# Patient Record
Sex: MALE | Race: BLACK OR AFRICAN AMERICAN | Employment: OTHER | URBAN - METROPOLITAN AREA
[De-identification: names, ages, dates, MRNs, and addresses within clinical notes are randomized per-mention and may not be internally consistent; named-entity substitution may affect disease eponyms.]

---

## 2020-01-30 ENCOUNTER — APPOINTMENT (OUTPATIENT)
Dept: GENERAL RADIOLOGY | Facility: HOSPITAL | Age: 65
DRG: 253 | End: 2020-01-30
Attending: EMERGENCY MEDICINE
Payer: MEDICARE

## 2020-01-30 ENCOUNTER — APPOINTMENT (OUTPATIENT)
Dept: MRI IMAGING | Facility: HOSPITAL | Age: 65
DRG: 253 | End: 2020-01-30
Attending: INTERNAL MEDICINE
Payer: MEDICARE

## 2020-01-30 ENCOUNTER — APPOINTMENT (OUTPATIENT)
Dept: ULTRASOUND IMAGING | Facility: HOSPITAL | Age: 65
DRG: 253 | End: 2020-01-30
Attending: EMERGENCY MEDICINE
Payer: MEDICARE

## 2020-01-30 ENCOUNTER — HOSPITAL ENCOUNTER (INPATIENT)
Facility: HOSPITAL | Age: 65
LOS: 6 days | Discharge: NURSING, REHAB OR LONG-TERM CARE | DRG: 253 | End: 2020-02-05
Attending: EMERGENCY MEDICINE | Admitting: HOSPITALIST
Payer: MEDICARE

## 2020-01-30 DIAGNOSIS — I82.431 ACUTE DEEP VEIN THROMBOSIS (DVT) OF POPLITEAL VEIN OF RIGHT LOWER EXTREMITY (HCC): ICD-10-CM

## 2020-01-30 DIAGNOSIS — L08.9 DIABETIC INFECTION OF RIGHT FOOT (HCC): Primary | ICD-10-CM

## 2020-01-30 DIAGNOSIS — E11.65 TYPE 2 DIABETES MELLITUS WITH HYPERGLYCEMIA, WITH LONG-TERM CURRENT USE OF INSULIN (HCC): ICD-10-CM

## 2020-01-30 DIAGNOSIS — E11.628 DIABETIC INFECTION OF RIGHT FOOT (HCC): Primary | ICD-10-CM

## 2020-01-30 DIAGNOSIS — Z79.4 TYPE 2 DIABETES MELLITUS WITH HYPERGLYCEMIA, WITH LONG-TERM CURRENT USE OF INSULIN (HCC): ICD-10-CM

## 2020-01-30 LAB
ANION GAP SERPL CALC-SCNC: 6 MMOL/L (ref 0–18)
APTT PPP: 27.5 SECONDS (ref 23.2–35.3)
APTT PPP: 87.6 SECONDS (ref 23.2–35.3)
BASOPHILS # BLD AUTO: 0.06 X10(3) UL (ref 0–0.2)
BASOPHILS NFR BLD AUTO: 0.7 %
BUN BLD-MCNC: 10 MG/DL (ref 7–18)
BUN/CREAT SERPL: 10.1 (ref 10–20)
CALCIUM BLD-MCNC: 9.5 MG/DL (ref 8.5–10.1)
CHLORIDE SERPL-SCNC: 101 MMOL/L (ref 98–112)
CO2 SERPL-SCNC: 29 MMOL/L (ref 21–32)
CREAT BLD-MCNC: 0.99 MG/DL (ref 0.7–1.3)
DEPRECATED RDW RBC AUTO: 39.5 FL (ref 35.1–46.3)
EOSINOPHIL # BLD AUTO: 0.25 X10(3) UL (ref 0–0.7)
EOSINOPHIL NFR BLD AUTO: 3 %
ERYTHROCYTE [DISTWIDTH] IN BLOOD BY AUTOMATED COUNT: 12.6 % (ref 11–15)
EST. AVERAGE GLUCOSE BLD GHB EST-MCNC: 384 MG/DL (ref 68–126)
GLUCOSE BLD-MCNC: 369 MG/DL (ref 70–99)
GLUCOSE BLDC GLUCOMTR-MCNC: 121 MG/DL (ref 70–99)
GLUCOSE BLDC GLUCOMTR-MCNC: 170 MG/DL (ref 70–99)
GLUCOSE BLDC GLUCOMTR-MCNC: 185 MG/DL (ref 70–99)
GLUCOSE BLDC GLUCOMTR-MCNC: 250 MG/DL (ref 70–99)
GLUCOSE BLDC GLUCOMTR-MCNC: 328 MG/DL (ref 70–99)
GLUCOSE BLDC GLUCOMTR-MCNC: 409 MG/DL (ref 70–99)
GLUCOSE BLDC GLUCOMTR-MCNC: 418 MG/DL (ref 70–99)
HBA1C MFR BLD HPLC: 15 % (ref ?–5.7)
HCT VFR BLD AUTO: 40.8 % (ref 39–53)
HGB BLD-MCNC: 13.2 G/DL (ref 13–17.5)
IMM GRANULOCYTES # BLD AUTO: 0.03 X10(3) UL (ref 0–1)
IMM GRANULOCYTES NFR BLD: 0.4 %
LACTATE SERPL-SCNC: 1.3 MMOL/L (ref 0.4–2)
LYMPHOCYTES # BLD AUTO: 2.22 X10(3) UL (ref 1–4)
LYMPHOCYTES NFR BLD AUTO: 26.5 %
MCH RBC QN AUTO: 27.7 PG (ref 26–34)
MCHC RBC AUTO-ENTMCNC: 32.4 G/DL (ref 31–37)
MCV RBC AUTO: 85.7 FL (ref 80–100)
MONOCYTES # BLD AUTO: 0.94 X10(3) UL (ref 0.1–1)
MONOCYTES NFR BLD AUTO: 11.2 %
NEUTROPHILS # BLD AUTO: 4.88 X10 (3) UL (ref 1.5–7.7)
NEUTROPHILS # BLD AUTO: 4.88 X10(3) UL (ref 1.5–7.7)
NEUTROPHILS NFR BLD AUTO: 58.2 %
OSMOLALITY SERPL CALC.SUM OF ELEC: 296 MOSM/KG (ref 275–295)
PLATELET # BLD AUTO: 362 10(3)UL (ref 150–450)
POTASSIUM SERPL-SCNC: 4.6 MMOL/L (ref 3.5–5.1)
RBC # BLD AUTO: 4.76 X10(6)UL (ref 4.3–5.7)
SODIUM SERPL-SCNC: 136 MMOL/L (ref 136–145)
WBC # BLD AUTO: 8.4 X10(3) UL (ref 4–11)

## 2020-01-30 PROCEDURE — 99221 1ST HOSP IP/OBS SF/LOW 40: CPT | Performed by: PODIATRIST

## 2020-01-30 PROCEDURE — 93971 EXTREMITY STUDY: CPT | Performed by: EMERGENCY MEDICINE

## 2020-01-30 PROCEDURE — 0Y9M0ZZ DRAINAGE OF RIGHT FOOT, OPEN APPROACH: ICD-10-PCS | Performed by: EMERGENCY MEDICINE

## 2020-01-30 PROCEDURE — 73630 X-RAY EXAM OF FOOT: CPT | Performed by: EMERGENCY MEDICINE

## 2020-01-30 RX ORDER — TRAMADOL HYDROCHLORIDE 50 MG/1
50 TABLET ORAL EVERY 6 HOURS PRN
Status: DISCONTINUED | OUTPATIENT
Start: 2020-01-30 | End: 2020-02-05

## 2020-01-30 RX ORDER — HEPARIN SODIUM AND DEXTROSE 10000; 5 [USP'U]/100ML; G/100ML
18 INJECTION INTRAVENOUS ONCE
Status: COMPLETED | OUTPATIENT
Start: 2020-01-30 | End: 2020-01-30

## 2020-01-30 RX ORDER — VANCOMYCIN HYDROCHLORIDE
15 EVERY 12 HOURS
Status: DISCONTINUED | OUTPATIENT
Start: 2020-01-30 | End: 2020-01-31

## 2020-01-30 RX ORDER — ATORVASTATIN CALCIUM 10 MG/1
10 TABLET, FILM COATED ORAL NIGHTLY
Status: DISCONTINUED | OUTPATIENT
Start: 2020-01-30 | End: 2020-02-05

## 2020-01-30 RX ORDER — POLYETHYLENE GLYCOL 3350 17 G/17G
17 POWDER, FOR SOLUTION ORAL DAILY PRN
Status: DISCONTINUED | OUTPATIENT
Start: 2020-01-30 | End: 2020-02-05

## 2020-01-30 RX ORDER — VANCOMYCIN 2 GRAM/500 ML IN 0.9 % SODIUM CHLORIDE INTRAVENOUS
25 ONCE
Status: COMPLETED | OUTPATIENT
Start: 2020-01-30 | End: 2020-01-30

## 2020-01-30 RX ORDER — BUDESONIDE AND FORMOTEROL FUMARATE DIHYDRATE 80; 4.5 UG/1; UG/1
2 AEROSOL RESPIRATORY (INHALATION) 2 TIMES DAILY PRN
COMMUNITY

## 2020-01-30 RX ORDER — HEPARIN SODIUM 1000 [USP'U]/ML
80 INJECTION, SOLUTION INTRAVENOUS; SUBCUTANEOUS ONCE
Status: COMPLETED | OUTPATIENT
Start: 2020-01-30 | End: 2020-01-30

## 2020-01-30 RX ORDER — HYDROCODONE BITARTRATE AND ACETAMINOPHEN 5; 325 MG/1; MG/1
2 TABLET ORAL ONCE
Status: COMPLETED | OUTPATIENT
Start: 2020-01-30 | End: 2020-01-30

## 2020-01-30 RX ORDER — ONDANSETRON 2 MG/ML
4 INJECTION INTRAMUSCULAR; INTRAVENOUS EVERY 6 HOURS PRN
Status: DISCONTINUED | OUTPATIENT
Start: 2020-01-30 | End: 2020-02-05

## 2020-01-30 RX ORDER — HEPARIN SODIUM AND DEXTROSE 10000; 5 [USP'U]/100ML; G/100ML
INJECTION INTRAVENOUS CONTINUOUS
Status: DISCONTINUED | OUTPATIENT
Start: 2020-01-30 | End: 2020-02-02

## 2020-01-30 RX ORDER — ATORVASTATIN CALCIUM 10 MG/1
10 TABLET, FILM COATED ORAL NIGHTLY
COMMUNITY

## 2020-01-30 RX ORDER — SODIUM CHLORIDE 0.9 % (FLUSH) 0.9 %
3 SYRINGE (ML) INJECTION AS NEEDED
Status: DISCONTINUED | OUTPATIENT
Start: 2020-01-30 | End: 2020-02-05

## 2020-01-30 RX ORDER — DEXTROSE MONOHYDRATE 25 G/50ML
50 INJECTION, SOLUTION INTRAVENOUS AS NEEDED
Status: DISCONTINUED | OUTPATIENT
Start: 2020-01-30 | End: 2020-02-05

## 2020-01-30 RX ORDER — BISACODYL 10 MG
10 SUPPOSITORY, RECTAL RECTAL
Status: DISCONTINUED | OUTPATIENT
Start: 2020-01-30 | End: 2020-02-05

## 2020-01-30 RX ORDER — ACETAMINOPHEN 325 MG/1
650 TABLET ORAL EVERY 6 HOURS PRN
Status: DISCONTINUED | OUTPATIENT
Start: 2020-01-30 | End: 2020-02-05

## 2020-01-30 NOTE — ED NOTES
after fluids and insulin. Patient on heparin drip at 19mL/hr. Patient A&O, cooperative. Being transported upstairs at this  Time.

## 2020-01-30 NOTE — ED INITIAL ASSESSMENT (HPI)
PRESENTS TO ED WITH WOUND TO PINKY TOE ON RIGHT FOOT WHICH PT STATES OCCURRED 4 DAYS AGO. UNSURE OF ORIGIN.  STATES IT STARTED AS \"SQUEEZING PUS FROM TOE\" AND BECAME AN ENLARGED, ODOROUS, DRAINING BLISTER

## 2020-01-30 NOTE — ED PROVIDER NOTES
Patient Seen in: [facility] AND [facility] Emergency Department      History   Patient presents with:  Lower Extremity Injury    Stated Complaint: blister to right toe diabetic     HPI    59year old male with a history of IDDM, also on antihypertensive and chol Current:/74 (BP Location: Right arm)   Pulse 70   Temp 98.3 °F (36.8 °C) (Oral)   Resp 18   Ht 203.2 cm (6' 8\")   Wt 103.8 kg   SpO2 98%   BMI 25.14 kg/m²         Physical Exam  Vitals signs and nursing note reviewed.    Constitutional:       General HEMOGLOBIN A1C - Abnormal; Notable for the following components:    HgbA1C 15.0 (*)     Estimated Average Glucose 384 (*)     All other components within normal limits   PTT, ACTIVATED - Abnormal; Notable for the following components:    PTT 87.6 (*)     A BLOOD CULTURE   ANAEROBIC CULTURE   CBC W/ DIFFERENTIAL           MDM     Pulse Ox: 98%, Normal, RA    Cardiac Monitor: Pulse Readings from Last 1 Encounters:  01/30/20 : 70  , sinus, normal for rate and rhythm     Radiology findings: Xr Foot, Complete (mi D/w Dr Alfreda Feliciano - will consult, advises send aerobic and anaerobic cultures from lanced abscess fluid. Abscess drainage: The patient abscess was located R dorsal foot.    I obtained verbal consent from the patient to drain the abscess who was informed ab

## 2020-01-30 NOTE — CONSULTS
[facility] AND [facility]  [address] Infectious Disease  Report of Consultation    Manolo Rodgers Patient Status:  Emergency    10/22/1955 MRN [id number]   Location [address] Attending Che Davis MD   Hosp Day # 0 PCP No primary care stridor. Cardiovascular: Negative for chest pain, palpitations, irregular heart beats. Gastrointestinal:  No abdominal pain, nausea, vomiting, diarrhea, or constipation. Genitourinary:  No dysuria, hematuria, urine urgency or frequency.    Integument/ CREATSERUM 0.99 01/30/2020    BUN 10 01/30/2020     01/30/2020    K 4.6 01/30/2020     01/30/2020    CO2 29.0 01/30/2020     01/30/2020    CA 9.5 01/30/2020    PTT 27.5 01/30/2020        Cultures:   GPC in pairs thus far    Radiology:

## 2020-01-30 NOTE — PROGRESS NOTES
[facility] Dosing Service    Initial Pharmacokinetic Consult for Vancomycin Dosing     Viji Membreno is a 59year old male who is being treated for diabetic foot. Pharmacy has been asked to dose Vancomycin by Dr. Kaur Bazan    He has No Known Allergies.

## 2020-01-30 NOTE — H&P
[facility] Hospitalist H&P       CC: Patient presents with:  Lower Extremity Injury       PCP: No primary care provider on file.     History of Present Illness: Patient is a 59year old male with PMH sig for COPD, HLD, HTN, DM on insulin, active smoker (1 cig/da pallor,     Nose: Nares normal. Septum midline. Mucosa normal. No drainage. Throat: Lips, mucosa, and tongue normal. Teeth and gums normal.   Neck: Supple    Lungs:   Clear to auscultation bilaterally.  Normal effort   Chest wall:  No tenderness or deform 1/30/2020 at 9:17     Approved by (CST): Eddie Stanford MD on 1/30/2020 at 9:22              ASSESSMENT / PLAN:   Patient is a 59year old male with PMH sig for COPD, HLD, HTN, DM on insulin, active smoker (1 cig/day) who presents with pain and drainag

## 2020-01-30 NOTE — ED NOTES
Pt has not had his medication today and states he ate a lot last night before bed including \"lots of fruit\".

## 2020-01-30 NOTE — ED NOTES
Orders for admission, patient is aware of plan and ready to go upstairs. Any questions, please call ED MAYNOR hamlin  at extension [phone]    Working with patients blood sugar, administering more insulin and fluids at this time, will re check BS after this.  Pt i

## 2020-01-31 ENCOUNTER — ANESTHESIA EVENT (OUTPATIENT)
Dept: SURGERY | Facility: HOSPITAL | Age: 65
DRG: 253 | End: 2020-01-31
Payer: MEDICARE

## 2020-01-31 ENCOUNTER — APPOINTMENT (OUTPATIENT)
Dept: INTERVENTIONAL RADIOLOGY/VASCULAR | Facility: HOSPITAL | Age: 65
DRG: 253 | End: 2020-01-31
Attending: RADIOLOGY
Payer: MEDICARE

## 2020-01-31 ENCOUNTER — ANESTHESIA (OUTPATIENT)
Dept: SURGERY | Facility: HOSPITAL | Age: 65
DRG: 253 | End: 2020-01-31
Payer: MEDICARE

## 2020-01-31 LAB
ANION GAP SERPL CALC-SCNC: 7 MMOL/L (ref 0–18)
APTT PPP: 30.7 SECONDS (ref 23.2–35.3)
APTT PPP: 64.9 SECONDS (ref 23.2–35.3)
BASOPHILS # BLD AUTO: 0.04 X10(3) UL (ref 0–0.2)
BASOPHILS NFR BLD AUTO: 0.5 %
BUN BLD-MCNC: 9 MG/DL (ref 7–18)
BUN/CREAT SERPL: 11.5 (ref 10–20)
CALCIUM BLD-MCNC: 9 MG/DL (ref 8.5–10.1)
CHLORIDE SERPL-SCNC: 103 MMOL/L (ref 98–112)
CO2 SERPL-SCNC: 27 MMOL/L (ref 21–32)
CREAT BLD-MCNC: 0.78 MG/DL (ref 0.7–1.3)
DEPRECATED RDW RBC AUTO: 38.7 FL (ref 35.1–46.3)
EOSINOPHIL # BLD AUTO: 0.26 X10(3) UL (ref 0–0.7)
EOSINOPHIL NFR BLD AUTO: 3.3 %
ERYTHROCYTE [DISTWIDTH] IN BLOOD BY AUTOMATED COUNT: 12.5 % (ref 11–15)
GLUCOSE BLD-MCNC: 104 MG/DL (ref 70–99)
GLUCOSE BLDC GLUCOMTR-MCNC: 143 MG/DL (ref 70–99)
GLUCOSE BLDC GLUCOMTR-MCNC: 150 MG/DL (ref 70–99)
GLUCOSE BLDC GLUCOMTR-MCNC: 300 MG/DL (ref 70–99)
GLUCOSE BLDC GLUCOMTR-MCNC: 81 MG/DL (ref 70–99)
GLUCOSE BLDC GLUCOMTR-MCNC: 97 MG/DL (ref 70–99)
HAV IGM SER QL: 1.9 MG/DL (ref 1.6–2.6)
HCT VFR BLD AUTO: 36.7 % (ref 39–53)
HGB BLD-MCNC: 12.2 G/DL (ref 13–17.5)
IMM GRANULOCYTES # BLD AUTO: 0.05 X10(3) UL (ref 0–1)
IMM GRANULOCYTES NFR BLD: 0.6 %
INR BLD: 0.95 (ref 0.9–1.2)
LYMPHOCYTES # BLD AUTO: 2.64 X10(3) UL (ref 1–4)
LYMPHOCYTES NFR BLD AUTO: 33.5 %
MCH RBC QN AUTO: 28.4 PG (ref 26–34)
MCHC RBC AUTO-ENTMCNC: 33.2 G/DL (ref 31–37)
MCV RBC AUTO: 85.3 FL (ref 80–100)
MONOCYTES # BLD AUTO: 0.93 X10(3) UL (ref 0.1–1)
MONOCYTES NFR BLD AUTO: 11.8 %
NEUTROPHILS # BLD AUTO: 3.97 X10 (3) UL (ref 1.5–7.7)
NEUTROPHILS # BLD AUTO: 3.97 X10(3) UL (ref 1.5–7.7)
NEUTROPHILS NFR BLD AUTO: 50.3 %
OSMOLALITY SERPL CALC.SUM OF ELEC: 283 MOSM/KG (ref 275–295)
PLATELET # BLD AUTO: 334 10(3)UL (ref 150–450)
POTASSIUM SERPL-SCNC: 4.5 MMOL/L (ref 3.5–5.1)
PROTHROMBIN TIME: 12.5 SECONDS (ref 11.8–14.5)
RBC # BLD AUTO: 4.3 X10(6)UL (ref 4.3–5.7)
SODIUM SERPL-SCNC: 137 MMOL/L (ref 136–145)
VANCOMYCIN TROUGH SERPL-MCNC: 10.9 UG/ML (ref 10–20)
WBC # BLD AUTO: 7.9 X10(3) UL (ref 4–11)

## 2020-01-31 PROCEDURE — 0Y6X0Z0 DETACHMENT AT RIGHT 5TH TOE, COMPLETE, OPEN APPROACH: ICD-10-PCS | Performed by: PODIATRIST

## 2020-01-31 PROCEDURE — 06H03DZ INSERTION OF INTRALUMINAL DEVICE INTO INFERIOR VENA CAVA, PERCUTANEOUS APPROACH: ICD-10-PCS | Performed by: RADIOLOGY

## 2020-01-31 PROCEDURE — B5191ZZ FLUOROSCOPY OF INFERIOR VENA CAVA USING LOW OSMOLAR CONTRAST: ICD-10-PCS | Performed by: RADIOLOGY

## 2020-01-31 PROCEDURE — 28820 AMPUTATION OF TOE: CPT | Performed by: PODIATRIST

## 2020-01-31 RX ORDER — HYDROMORPHONE HYDROCHLORIDE 1 MG/ML
0.2 INJECTION, SOLUTION INTRAMUSCULAR; INTRAVENOUS; SUBCUTANEOUS EVERY 5 MIN PRN
Status: DISCONTINUED | OUTPATIENT
Start: 2020-01-31 | End: 2020-01-31 | Stop reason: HOSPADM

## 2020-01-31 RX ORDER — HYDROMORPHONE HYDROCHLORIDE 1 MG/ML
0.4 INJECTION, SOLUTION INTRAMUSCULAR; INTRAVENOUS; SUBCUTANEOUS EVERY 5 MIN PRN
Status: DISCONTINUED | OUTPATIENT
Start: 2020-01-31 | End: 2020-01-31 | Stop reason: HOSPADM

## 2020-01-31 RX ORDER — MORPHINE SULFATE 4 MG/ML
4 INJECTION, SOLUTION INTRAMUSCULAR; INTRAVENOUS EVERY 10 MIN PRN
Status: DISCONTINUED | OUTPATIENT
Start: 2020-01-31 | End: 2020-01-31 | Stop reason: HOSPADM

## 2020-01-31 RX ORDER — VANCOMYCIN/0.9 % SOD CHLORIDE 1.75 G/5
1750 PLASTIC BAG, INJECTION (ML) INTRAVENOUS EVERY 12 HOURS
Status: DISCONTINUED | OUTPATIENT
Start: 2020-01-31 | End: 2020-02-02

## 2020-01-31 RX ORDER — HYDROCODONE BITARTRATE AND ACETAMINOPHEN 5; 325 MG/1; MG/1
1 TABLET ORAL AS NEEDED
Status: DISCONTINUED | OUTPATIENT
Start: 2020-01-31 | End: 2020-01-31 | Stop reason: HOSPADM

## 2020-01-31 RX ORDER — HYDROCODONE BITARTRATE AND ACETAMINOPHEN 5; 325 MG/1; MG/1
2 TABLET ORAL AS NEEDED
Status: DISCONTINUED | OUTPATIENT
Start: 2020-01-31 | End: 2020-01-31 | Stop reason: HOSPADM

## 2020-01-31 RX ORDER — MORPHINE SULFATE 4 MG/ML
2 INJECTION, SOLUTION INTRAMUSCULAR; INTRAVENOUS EVERY 10 MIN PRN
Status: DISCONTINUED | OUTPATIENT
Start: 2020-01-31 | End: 2020-01-31 | Stop reason: HOSPADM

## 2020-01-31 RX ORDER — LIDOCAINE HYDROCHLORIDE 20 MG/ML
INJECTION, SOLUTION EPIDURAL; INFILTRATION; INTRACAUDAL; PERINEURAL
Status: COMPLETED
Start: 2020-01-31 | End: 2020-01-31

## 2020-01-31 RX ORDER — MORPHINE SULFATE 10 MG/ML
6 INJECTION, SOLUTION INTRAMUSCULAR; INTRAVENOUS EVERY 10 MIN PRN
Status: DISCONTINUED | OUTPATIENT
Start: 2020-01-31 | End: 2020-01-31 | Stop reason: HOSPADM

## 2020-01-31 RX ORDER — SODIUM CHLORIDE, SODIUM LACTATE, POTASSIUM CHLORIDE, CALCIUM CHLORIDE 600; 310; 30; 20 MG/100ML; MG/100ML; MG/100ML; MG/100ML
INJECTION, SOLUTION INTRAVENOUS CONTINUOUS
Status: DISCONTINUED | OUTPATIENT
Start: 2020-01-31 | End: 2020-01-31 | Stop reason: HOSPADM

## 2020-01-31 RX ORDER — NALOXONE HYDROCHLORIDE 0.4 MG/ML
80 INJECTION, SOLUTION INTRAMUSCULAR; INTRAVENOUS; SUBCUTANEOUS AS NEEDED
Status: DISCONTINUED | OUTPATIENT
Start: 2020-01-31 | End: 2020-01-31 | Stop reason: HOSPADM

## 2020-01-31 RX ORDER — MIDAZOLAM HYDROCHLORIDE 1 MG/ML
INJECTION INTRAMUSCULAR; INTRAVENOUS AS NEEDED
Status: DISCONTINUED | OUTPATIENT
Start: 2020-01-31 | End: 2020-01-31 | Stop reason: SURG

## 2020-01-31 RX ORDER — PROCHLORPERAZINE EDISYLATE 5 MG/ML
5 INJECTION INTRAMUSCULAR; INTRAVENOUS ONCE AS NEEDED
Status: DISCONTINUED | OUTPATIENT
Start: 2020-01-31 | End: 2020-01-31 | Stop reason: HOSPADM

## 2020-01-31 RX ORDER — BUPIVACAINE HYDROCHLORIDE 5 MG/ML
INJECTION, SOLUTION EPIDURAL; INTRACAUDAL AS NEEDED
Status: DISCONTINUED | OUTPATIENT
Start: 2020-01-31 | End: 2020-01-31 | Stop reason: HOSPADM

## 2020-01-31 RX ORDER — SODIUM CHLORIDE 0.9 % (FLUSH) 0.9 %
10 SYRINGE (ML) INJECTION AS NEEDED
Status: DISCONTINUED | OUTPATIENT
Start: 2020-01-31 | End: 2020-01-31 | Stop reason: HOSPADM

## 2020-01-31 RX ORDER — ONDANSETRON 2 MG/ML
4 INJECTION INTRAMUSCULAR; INTRAVENOUS ONCE AS NEEDED
Status: DISCONTINUED | OUTPATIENT
Start: 2020-01-31 | End: 2020-01-31 | Stop reason: HOSPADM

## 2020-01-31 RX ORDER — SODIUM CHLORIDE 9 MG/ML
INJECTION, SOLUTION INTRAVENOUS CONTINUOUS
Status: DISCONTINUED | OUTPATIENT
Start: 2020-01-31 | End: 2020-01-31

## 2020-01-31 RX ORDER — HYDROMORPHONE HYDROCHLORIDE 1 MG/ML
0.6 INJECTION, SOLUTION INTRAMUSCULAR; INTRAVENOUS; SUBCUTANEOUS EVERY 5 MIN PRN
Status: DISCONTINUED | OUTPATIENT
Start: 2020-01-31 | End: 2020-01-31 | Stop reason: HOSPADM

## 2020-01-31 RX ORDER — DEXTROSE MONOHYDRATE 25 G/50ML
50 INJECTION, SOLUTION INTRAVENOUS
Status: DISCONTINUED | OUTPATIENT
Start: 2020-01-31 | End: 2020-01-31 | Stop reason: HOSPADM

## 2020-01-31 RX ORDER — MIDAZOLAM HYDROCHLORIDE 1 MG/ML
INJECTION INTRAMUSCULAR; INTRAVENOUS
Status: COMPLETED
Start: 2020-01-31 | End: 2020-01-31

## 2020-01-31 RX ADMIN — MIDAZOLAM HYDROCHLORIDE 2 MG: 1 INJECTION INTRAMUSCULAR; INTRAVENOUS at 10:40:00

## 2020-01-31 NOTE — PROGRESS NOTES
[facility] AND [facility] Infectious Disease Progress Note    Rubin Pérez Patient Status:  Inpatient    10/22/1955 MRN [id number]   Location [facility] 5SW/SE Attending Yaquelin Conway MD   Hosp Day # 1 PCP No primary care provider on file.      Subjecti PRN    Physical Exam:  General: Alert, orientated x3. Cooperative. No apparent distress. Vital Signs:  Blood pressure 142/87, pulse 64, temperature 97.6 °F (36.4 °C), temperature source Oral, resp.  rate 18, height 6' 8\" (2.032 m), weight 228 lb 13.4 oz the R foot due to the above: IV antibiotics as above     3. DM II: Patient needs tight glycemic control for optimal treatment of his infection     4. Acute RLE DVT: Anticoagulation     5. Disposition - inpatient. F/u pending cultures.   Trending temps a

## 2020-01-31 NOTE — BRIEF OP NOTE
Pre-Operative Diagnosis: Osteomyelitis with gangrene fifth toe right foot cellulitis right foot     Post-Operative Diagnosis: Same     Procedure Performed:   Procedure(s):  5TH TOE AMPUTATION RIGHT FOOT, disarticulation at the MTP joint    Surgeon(s) and FELICIANO

## 2020-01-31 NOTE — PROGRESS NOTES
[facility] Hospitalist Progress Note     CC: Hospital Follow up    PCP: No primary care provider on file.        Assessment/Plan:     Principal Problem:    Diabetic infection of right foot ([address]  Active Problems:    Acute deep vein thrombosis (DVT) of popliteal ve Gordy Stevens MD    [facility] Hospitalist     Subjective:     Feeling better, no pain in foot, no chest pain or sob, no nausea or vomiting    OBJECTIVE:    Blood pressure 142/87, pulse 64, temperature 97.6 °F (36.4 °C), temperature source Oral, resp.  rate 18, consistent with inflammatory process/abscess.     Dictated by (CST): Shyanne Trivedi MD on 1/30/2020 at 8:44     Approved by (CST): Keyla Desai MD on 1/30/2020 at 8:46          Us Venous Doppler Leg Right - Diag Img (cpt=93971)    Result

## 2020-01-31 NOTE — ANESTHESIA PREPROCEDURE EVALUATION
Anesthesia PreOp Note    HPI:     Debbie Leach is a 59year old male who presents for preoperative consultation requested by: Gómez Garcia DPM    Date of Surgery: 1/30/2020 - 1/31/2020    Procedure(s):  TOE AMPUTATION  Indication: Type 2 diabetes me Rebeca Lieberman MD, Last Rate: 125 mL/hr at 01/31/20 0752, 1,500 mg at 01/31/20 0752  cefTRIAXone Sodium (ROCEPHIN) 2 g in sodium chloride 0.9% 100 mL MBP/ADD-vantage, 2 g, Intravenous, Q24H, Rebeca Lieberman MD, Stopped at 01/30/20 1438  [MAR Hold] Normal Saline Flu History   Problem Relation Age of Onset   • Diabetes Mother      Social History    Socioeconomic History      Marital status: Single      Spouse name: Not on file      Number of children: Not on file      Years of education: Not on file      Highest educat  01/31/2020    CO2 27.0 01/31/2020    BUN 9 01/31/2020    CREATSERUM 0.78 01/31/2020     (H) 01/31/2020    PGLU 150 (H) 01/31/2020    CA 9.0 01/31/2020     Lab Results   Component Value Date    INR 0.95 01/31/2020       Vital Signs:   Body m

## 2020-01-31 NOTE — ANESTHESIA POSTPROCEDURE EVALUATION
Patient: Moses Mendez    Procedure Summary     Date:  01/31/20 Room / Location:  [address] [place] [address]    Anesthesia Start:  0801 Anesthesia Stop:  0748    Procedure:  TOE AMPUTATION (Right Foot) Diagnosis:       Type 2 diabetes mellitus with hyperg

## 2020-01-31 NOTE — CONSULTS
[facility]    Report of Consultation    Viji Membreno Patient Status:  Inpatient    10/22/1955 MRN [id number]   Location [facility] 5SW/SE Attending Sahil Lizama MD   Hosp Day # 0 PCP No primary care provider on file.      Date of Adm Glucose-Vitamin C (DEX-4) chewable tab 4 tablet, 4 tablet, Oral, Q15 Min PRN  •  glucose (DEX4) oral liquid 15 g, 15 g, Oral, Q15 Min PRN  •  acetaminophen (TYLENOL) tab 650 mg, 650 mg, Oral, Q6H PRN  •  PEG 3350 (MIRALAX) powder packet 17 g, 17 g, Oral, D (gtd=[id number])    Result Date: 1/30/2020  CONCLUSION:  1. No radiographic evidence of osteomyelitis. Osteoarthritis in the right ankle.   2. Soft tissue swelling with evidence of soft tissue gas and air-fluid level consistent with inflammatory process/abscess

## 2020-01-31 NOTE — DIETARY NOTE
NUTRITION EDUCATION:    Patient educated regarding diabetes diet basics. Discussed carbohydrate foods, portion sizes, and food label reading. Handout given and initial meal plan provided. Encouraged to attend outpatient diabetes education.  Questions answe

## 2020-01-31 NOTE — BRIEF PROCEDURE NOTE
[facility]  Procedure Note    Verónica Gonzalez Patient Status:  Inpatient    10/22/1955 MRN [id number]   Location [facility] Attending Beto Miller MD   Hosp Day # 1 PCP No primary care provider on file.      Pr

## 2020-01-31 NOTE — PLAN OF CARE
Problem: Patient Centered Care  Goal: Patient preferences are identified and integrated in the patient's plan of care  Description  Interventions:  - What would you like us to know as we care for you?  I live in [place]  - Provide timely, complete, and a

## 2020-02-01 LAB
ANION GAP SERPL CALC-SCNC: 6 MMOL/L (ref 0–18)
APTT PPP: 69.5 SECONDS (ref 23.2–35.3)
BUN BLD-MCNC: 7 MG/DL (ref 7–18)
BUN/CREAT SERPL: 10.6 (ref 10–20)
CALCIUM BLD-MCNC: 8.9 MG/DL (ref 8.5–10.1)
CHLORIDE SERPL-SCNC: 102 MMOL/L (ref 98–112)
CO2 SERPL-SCNC: 25 MMOL/L (ref 21–32)
CREAT BLD-MCNC: 0.66 MG/DL (ref 0.7–1.3)
DEPRECATED RDW RBC AUTO: 35.8 FL (ref 35.1–46.3)
ERYTHROCYTE [DISTWIDTH] IN BLOOD BY AUTOMATED COUNT: 12.4 % (ref 11–15)
GLUCOSE BLD-MCNC: 94 MG/DL (ref 70–99)
GLUCOSE BLDC GLUCOMTR-MCNC: 102 MG/DL (ref 70–99)
GLUCOSE BLDC GLUCOMTR-MCNC: 136 MG/DL (ref 70–99)
GLUCOSE BLDC GLUCOMTR-MCNC: 301 MG/DL (ref 70–99)
GLUCOSE BLDC GLUCOMTR-MCNC: 96 MG/DL (ref 70–99)
HAV IGM SER QL: 1.8 MG/DL (ref 1.6–2.6)
HCT VFR BLD AUTO: 35.4 % (ref 39–53)
HGB BLD-MCNC: 12 G/DL (ref 13–17.5)
MCH RBC QN AUTO: 27.4 PG (ref 26–34)
MCHC RBC AUTO-ENTMCNC: 33.9 G/DL (ref 31–37)
MCV RBC AUTO: 80.8 FL (ref 80–100)
OSMOLALITY SERPL CALC.SUM OF ELEC: 274 MOSM/KG (ref 275–295)
PLATELET # BLD AUTO: 342 10(3)UL (ref 150–450)
POTASSIUM SERPL-SCNC: 4 MMOL/L (ref 3.5–5.1)
RBC # BLD AUTO: 4.38 X10(6)UL (ref 4.3–5.7)
SODIUM SERPL-SCNC: 133 MMOL/L (ref 136–145)
WBC # BLD AUTO: 9.7 X10(3) UL (ref 4–11)

## 2020-02-01 RX ORDER — MAGNESIUM OXIDE 400 MG (241.3 MG MAGNESIUM) TABLET
400 TABLET ONCE
Status: COMPLETED | OUTPATIENT
Start: 2020-02-01 | End: 2020-02-01

## 2020-02-01 NOTE — PLAN OF CARE
Problem: Patient Centered Care  Goal: Patient preferences are identified and integrated in the patient's plan of care  Description  Interventions:  - What would you like us to know as we care for you?  From [place];   - Provide timely, comple and evaluate response  - Implement non-pharmacological measures as appropriate and evaluate response  - Consider cultural and social influences on pain and pain management  - Manage/alleviate anxiety  - Utilize distraction and/or relaxation techniques  - M

## 2020-02-01 NOTE — PROGRESS NOTES
For 2/1/20 at 0846AM ptt is 69.5. Per heparin drip protocol for dvt/p.e., rate to stay the same at 19, ptt in a.m. per protocol.

## 2020-02-01 NOTE — PROGRESS NOTES
Postoperative day #1  Subjective: Patient temperature 98.4, pulse 76, respirations 20, blood pressure 132/72 SPO2 is 96%    Objective: Dressing was clean dry intact on presentation removal shows only mild hemorrhagic strikethrough dressing removal shows no

## 2020-02-01 NOTE — PROGRESS NOTES
[facility] dosing service    Follow-up Pharmacokinetic Consult for Vancomycin Dosing     Beto Hma is a 59year old male who is being treated for R foot cellulitis and diabetic foot.  Patient is on day 2 of Vancomycin and is currently receiving 1.75 gm daily while on Vancomycin to assess renal function. 4.  Pharmacy will follow and adjust as necessary. We appreciate the opportunity to assist in his care.     Apoorva Beckman, [facility]  1/31/2020  9:07 PM  [address] Extension: [phone]

## 2020-02-01 NOTE — PHYSICAL THERAPY NOTE
PHYSICAL THERAPY EVALUATION - INPATIENT     Room Number: 564/564-A  Evaluation Date: 2/1/2020  Type of Evaluation: Initial   Physician Order: PT Eval and Treat    Presenting Problem: (Diabetic Rt foot infection,S/P toe amputatoion, IVC filter )  Reason fo the patient's pre-admission status. The patient's Approx Degree of Impairment: 46.58% has been calculated based on documentation in the [facility] '6 clicks' Inpatient Basic Mobility Short Form.   Research supports that patients with this level of impairment History  Past Medical History:   Diagnosis Date   • COPD (chronic obstructive pulmonary disease) [facility]    • Diabetes [facility]    • HLD (hyperlipidemia)    • HTN (hypertension)        Past Surgical History  Past Surgical History:   Procedure Laterality Date Moving from lying on back to sitting on the side of the bed?: A Little   How much help from another person does the patient currently need. ..   -   Moving to and from a bed to a chair (including a wheelchair)?: A Little   -   Need to walk in hospital room

## 2020-02-01 NOTE — OCCUPATIONAL THERAPY NOTE
OCCUPATIONAL THERAPY EVALUATION - INPATIENT     Room Number: 564/564-A  Evaluation Date: 2/1/2020  Type of Evaluation: Initial  Presenting Problem: diabetic infection of R foot    Physician Order: IP Consult to Occupational Therapy  Reason for Therapy: ADL Treatment Plan: Balance activities; Energy conservation/work simplification techniques;ADL training;IADL training;Functional transfer training;Patient/Family training;Patient/Family education;Equipment eval/education       OCCUPATIONAL THERAPY MEDICAL/SOCIA intact    Communication: able to communicate wants and needs    Behavioral/Emotional/Social: calm and cooperative    RANGE OF MOTION   Upper extremity ROM is within functional limits    STRENGTH ASSESSMENT  Upper extremity strength is within functional rowley Patient will complete item retrieval with MOD I  Comment:          Goals  on: 2/15/2020  Frequency: 3-5x/week    Pepper Diomedes, OTR/L  [place]

## 2020-02-01 NOTE — PROGRESS NOTES
[facility] Infectious Disease Progress Note    Sammy Eric Patient Status:  Inpatient    10/22/1955 MRN [id number]   Location [facility] 5SW/SE Attending Meredith Otto MD   Hosp Day # 2 PCP No primary care provider on file.      Subjecti orientated x3. Cooperative. No apparent distress. Vital Signs:  Blood pressure 132/72, pulse 76, temperature 98.4 °F (36.9 °C), temperature source Oral, resp. rate 20, height 6' 8\" (2.032 m), weight 228 lb 13.4 oz (103.8 kg), SpO2 96 %.    Temp (24hrs), antibiotics as above     3.  DM II: Patient needs tight glycemic control for optimal treatment of his infection     4.  Acute RLE DVT: Anticoagulation     5.  Disposition - inpatient.  F/u pending cultures.  Trending temps and WBCs.  Continue IV vancomycin

## 2020-02-01 NOTE — PROGRESS NOTES
[facility] Hospitalist Progress Note     CC: Hospital Follow up    PCP: No primary care provider on file.        Assessment/Plan:     Principal Problem:    Diabetic infection of right foot ([address]  Active Problems:    Acute deep vein thrombosis (DVT) of popliteal ve and/or family by bedside.     Thank Mckayla Miguel MD    [facility] Hospitalist     Subjective:     Feeling better, mild to moderate pain in foot and right hip, no chest pain or sob, no nausea or vomiting    OBJECTIVE:    Blood pressure 132/72, pulse 76, temperat Date: 1/30/2020  CONCLUSION:  1. No radiographic evidence of osteomyelitis. Osteoarthritis in the right ankle. 2. Soft tissue swelling with evidence of soft tissue gas and air-fluid level consistent with inflammatory process/abscess.     Dictated by (CST)

## 2020-02-02 LAB
ANION GAP SERPL CALC-SCNC: 7 MMOL/L (ref 0–18)
APTT PPP: 79.8 SECONDS (ref 23.2–35.3)
BASOPHILS # BLD AUTO: 0.05 X10(3) UL (ref 0–0.2)
BASOPHILS NFR BLD AUTO: 0.5 %
BUN BLD-MCNC: 6 MG/DL (ref 7–18)
BUN/CREAT SERPL: 9 (ref 10–20)
CALCIUM BLD-MCNC: 9.2 MG/DL (ref 8.5–10.1)
CHLORIDE SERPL-SCNC: 102 MMOL/L (ref 98–112)
CO2 SERPL-SCNC: 27 MMOL/L (ref 21–32)
CREAT BLD-MCNC: 0.67 MG/DL (ref 0.7–1.3)
DEPRECATED RDW RBC AUTO: 38.8 FL (ref 35.1–46.3)
EOSINOPHIL # BLD AUTO: 0.36 X10(3) UL (ref 0–0.7)
EOSINOPHIL NFR BLD AUTO: 3.6 %
ERYTHROCYTE [DISTWIDTH] IN BLOOD BY AUTOMATED COUNT: 12.6 % (ref 11–15)
GLUCOSE BLD-MCNC: 79 MG/DL (ref 70–99)
GLUCOSE BLDC GLUCOMTR-MCNC: 169 MG/DL (ref 70–99)
GLUCOSE BLDC GLUCOMTR-MCNC: 215 MG/DL (ref 70–99)
GLUCOSE BLDC GLUCOMTR-MCNC: 268 MG/DL (ref 70–99)
GLUCOSE BLDC GLUCOMTR-MCNC: 86 MG/DL (ref 70–99)
HAV IGM SER QL: 1.9 MG/DL (ref 1.6–2.6)
HCT VFR BLD AUTO: 37.6 % (ref 39–53)
HGB BLD-MCNC: 12.5 G/DL (ref 13–17.5)
IMM GRANULOCYTES # BLD AUTO: 0.08 X10(3) UL (ref 0–1)
IMM GRANULOCYTES NFR BLD: 0.8 %
INR BLD: 1.08 (ref 0.9–1.2)
LYMPHOCYTES # BLD AUTO: 3.06 X10(3) UL (ref 1–4)
LYMPHOCYTES NFR BLD AUTO: 30.8 %
MCH RBC QN AUTO: 28.3 PG (ref 26–34)
MCHC RBC AUTO-ENTMCNC: 33.2 G/DL (ref 31–37)
MCV RBC AUTO: 85.3 FL (ref 80–100)
MONOCYTES # BLD AUTO: 1.22 X10(3) UL (ref 0.1–1)
MONOCYTES NFR BLD AUTO: 12.3 %
NEUTROPHILS # BLD AUTO: 5.17 X10 (3) UL (ref 1.5–7.7)
NEUTROPHILS # BLD AUTO: 5.17 X10(3) UL (ref 1.5–7.7)
NEUTROPHILS NFR BLD AUTO: 52 %
OSMOLALITY SERPL CALC.SUM OF ELEC: 279 MOSM/KG (ref 275–295)
POTASSIUM SERPL-SCNC: 3.8 MMOL/L (ref 3.5–5.1)
PROTHROMBIN TIME: 13.8 SECONDS (ref 11.8–14.5)
RBC # BLD AUTO: 4.41 X10(6)UL (ref 4.3–5.7)
SODIUM SERPL-SCNC: 136 MMOL/L (ref 136–145)
VANCOMYCIN TROUGH SERPL-MCNC: 13.1 UG/ML (ref 10–20)
WBC # BLD AUTO: 9.9 X10(3) UL (ref 4–11)

## 2020-02-02 RX ORDER — HEPARIN SODIUM AND DEXTROSE 10000; 5 [USP'U]/100ML; G/100ML
INJECTION INTRAVENOUS CONTINUOUS
Status: DISCONTINUED | OUTPATIENT
Start: 2020-02-02 | End: 2020-02-05

## 2020-02-02 RX ORDER — VANCOMYCIN 2 GRAM/500 ML IN 0.9 % SODIUM CHLORIDE INTRAVENOUS
2000 EVERY 12 HOURS
Status: DISCONTINUED | OUTPATIENT
Start: 2020-02-02 | End: 2020-02-03

## 2020-02-02 RX ORDER — POTASSIUM CHLORIDE 20 MEQ/1
40 TABLET, EXTENDED RELEASE ORAL ONCE
Status: COMPLETED | OUTPATIENT
Start: 2020-02-02 | End: 2020-02-02

## 2020-02-02 NOTE — PROGRESS NOTES
02/02/20  0700   BP: 114/87   Pulse: 73   Resp: 18   Temp: 98.6 °F (37 °C)   Patient seen resting comfortably in bed still complaining of some left hip pain. However his foot is been feeling better just a little throbbing.   No complaints of fever chills

## 2020-02-02 NOTE — PROGRESS NOTES
[facility] dosing service    Follow-up Pharmacokinetic Consult for Vancomycin Dosing     Zhou Pineda is a 59year old male who is being treated for a diabetic foot infection.  Patient is on day 4 of Vancomycin and is currently receiving 1.75 gm IV Q 12 h

## 2020-02-02 NOTE — PROGRESS NOTES
[facility] Hospitalist Progress Note     CC: Hospital Follow up    PCP: No primary care provider on file.        Assessment/Plan:     Principal Problem:    Diabetic infection of right foot ([address]  Active Problems:    Acute deep vein thrombosis (DVT) of popliteal ve filter  - IR consulted plan for IVC, s/p IVC filter 1/31   - needs to plan IVC filter removal with IR  - resumed AC, with heparin drip, transition to DOAC once bleeding stable and no further procedures planned, and ok with podiatry     DM   - A1c 15  - con Lab 01/30/20  0801 01/31/20  0737 02/01/20  0706 02/02/20  0658   RBC 4.76 4.30 4.38 4.41   HGB 13.2 12.2* 12.0* 12.5*   HCT 40.8 36.7* 35.4* 37.6*   MCV 85.7 85.3 80.8 85.3   MCH 27.7 28.4 27.4 28.3   MCHC 32.4 33.2 33.9 33.2   RDW 12.6 12.5 12.4 12.6

## 2020-02-02 NOTE — PROGRESS NOTES
Ptt this am 79.8 heparin drip infusion at 19ml/hr per heparin protocol, no changes per protocol perimeters. Ptt in am per protocol.

## 2020-02-03 ENCOUNTER — APPOINTMENT (OUTPATIENT)
Dept: ULTRASOUND IMAGING | Facility: HOSPITAL | Age: 65
DRG: 253 | End: 2020-02-03
Attending: PODIATRIST
Payer: MEDICARE

## 2020-02-03 ENCOUNTER — APPOINTMENT (OUTPATIENT)
Dept: PICC SERVICES | Facility: HOSPITAL | Age: 65
DRG: 253 | End: 2020-02-03
Attending: INTERNAL MEDICINE
Payer: MEDICARE

## 2020-02-03 ENCOUNTER — APPOINTMENT (OUTPATIENT)
Dept: MRI IMAGING | Facility: HOSPITAL | Age: 65
DRG: 253 | End: 2020-02-03
Attending: PODIATRIST
Payer: MEDICARE

## 2020-02-03 LAB
ANION GAP SERPL CALC-SCNC: 1 MMOL/L (ref 0–18)
APTT PPP: 58.7 SECONDS (ref 23.2–35.3)
APTT PPP: 91.9 SECONDS (ref 23.2–35.3)
BASOPHILS # BLD AUTO: 0.03 X10(3) UL (ref 0–0.2)
BASOPHILS NFR BLD AUTO: 0.3 %
BUN BLD-MCNC: 5 MG/DL (ref 7–18)
BUN/CREAT SERPL: 6.3 (ref 10–20)
CALCIUM BLD-MCNC: 9.4 MG/DL (ref 8.5–10.1)
CHLORIDE SERPL-SCNC: 99 MMOL/L (ref 98–112)
CO2 SERPL-SCNC: 35 MMOL/L (ref 21–32)
CREAT BLD-MCNC: 0.79 MG/DL (ref 0.7–1.3)
DEPRECATED RDW RBC AUTO: 40.1 FL (ref 35.1–46.3)
EOSINOPHIL # BLD AUTO: 0.34 X10(3) UL (ref 0–0.7)
EOSINOPHIL NFR BLD AUTO: 3.8 %
ERYTHROCYTE [DISTWIDTH] IN BLOOD BY AUTOMATED COUNT: 12.6 % (ref 11–15)
GLUCOSE BLD-MCNC: 171 MG/DL (ref 70–99)
GLUCOSE BLDC GLUCOMTR-MCNC: 166 MG/DL (ref 70–99)
GLUCOSE BLDC GLUCOMTR-MCNC: 177 MG/DL (ref 70–99)
GLUCOSE BLDC GLUCOMTR-MCNC: 248 MG/DL (ref 70–99)
GLUCOSE BLDC GLUCOMTR-MCNC: 267 MG/DL (ref 70–99)
HAV IGM SER QL: 1.7 MG/DL (ref 1.6–2.6)
HCT VFR BLD AUTO: 37.8 % (ref 39–53)
HGB BLD-MCNC: 12.3 G/DL (ref 13–17.5)
IMM GRANULOCYTES # BLD AUTO: 0.07 X10(3) UL (ref 0–1)
IMM GRANULOCYTES NFR BLD: 0.8 %
INR BLD: 1.08 (ref 0.9–1.2)
LYMPHOCYTES # BLD AUTO: 2.06 X10(3) UL (ref 1–4)
LYMPHOCYTES NFR BLD AUTO: 23 %
MCH RBC QN AUTO: 28.3 PG (ref 26–34)
MCHC RBC AUTO-ENTMCNC: 32.5 G/DL (ref 31–37)
MCV RBC AUTO: 86.9 FL (ref 80–100)
MONOCYTES # BLD AUTO: 1.16 X10(3) UL (ref 0.1–1)
MONOCYTES NFR BLD AUTO: 13 %
NEUTROPHILS # BLD AUTO: 5.29 X10 (3) UL (ref 1.5–7.7)
NEUTROPHILS # BLD AUTO: 5.29 X10(3) UL (ref 1.5–7.7)
NEUTROPHILS NFR BLD AUTO: 59.1 %
OSMOLALITY SERPL CALC.SUM OF ELEC: 281 MOSM/KG (ref 275–295)
PLATELET # BLD AUTO: 370 10(3)UL (ref 150–450)
POTASSIUM SERPL-SCNC: 4.6 MMOL/L (ref 3.5–5.1)
PROTHROMBIN TIME: 13.8 SECONDS (ref 11.8–14.5)
RBC # BLD AUTO: 4.35 X10(6)UL (ref 4.3–5.7)
SODIUM SERPL-SCNC: 135 MMOL/L (ref 136–145)
WBC # BLD AUTO: 9 X10(3) UL (ref 4–11)

## 2020-02-03 PROCEDURE — 02HV33Z INSERTION OF INFUSION DEVICE INTO SUPERIOR VENA CAVA, PERCUTANEOUS APPROACH: ICD-10-PCS | Performed by: HOSPITALIST

## 2020-02-03 PROCEDURE — 73720 MRI LWR EXTREMITY W/O&W/DYE: CPT | Performed by: PODIATRIST

## 2020-02-03 RX ORDER — MAGNESIUM OXIDE 400 MG (241.3 MG MAGNESIUM) TABLET
400 TABLET ONCE
Status: COMPLETED | OUTPATIENT
Start: 2020-02-03 | End: 2020-02-03

## 2020-02-03 RX ORDER — SODIUM CHLORIDE 0.9 % (FLUSH) 0.9 %
10 SYRINGE (ML) INJECTION AS NEEDED
Status: DISCONTINUED | OUTPATIENT
Start: 2020-02-03 | End: 2020-02-05

## 2020-02-03 RX ORDER — LORAZEPAM 1 MG/1
1 TABLET ORAL ONCE
Status: COMPLETED | OUTPATIENT
Start: 2020-02-03 | End: 2020-02-03

## 2020-02-03 RX ORDER — LIDOCAINE HYDROCHLORIDE 10 MG/ML
0.5 INJECTION, SOLUTION INFILTRATION; PERINEURAL ONCE AS NEEDED
Status: ACTIVE | OUTPATIENT
Start: 2020-02-03 | End: 2020-02-03

## 2020-02-03 NOTE — PROGRESS NOTES
[facility] AND [facility] Infectious Disease  Progress Note    Rubin Pérez Patient Status:  Inpatient    10/22/1955 MRN [id number]   Location [facility] 5SW/SE Attending Francheska Mcnally MD   Hosp Day # 4 PCP No primary care provider on file.      Mirtha Sellers blistering to R 5th toe and dorsum of foot  - Cultures obtained - alpha hemolytic strep, strep mitis/oralis  - POD #3 s/p 5th toe amputation at MTP  - IV vancomycin and ceftriaxone ongoing - will d/c IV ceftriaxone    2.   Cellulitis to the R foot due to th

## 2020-02-03 NOTE — PLAN OF CARE
Problem: Patient Centered Care  Goal: Patient preferences are identified and integrated in the patient's plan of care  Description  Interventions:  - What would you like us to know as we care for you?  From [place];   - Provide timely, comple MD/LIP if interventions unsuccessful or patient reports new pain  - Anticipate increased pain with activity and pre-medicate as appropriate  Outcome: Progressing     Problem: RISK FOR INFECTION - ADULT  Goal: Absence of fever/infection during anticipated n

## 2020-02-03 NOTE — PROGRESS NOTES
[facility] Hospitalist Progress Note     CC: Hospital Follow up    PCP: No primary care provider on file.        Assessment/Plan:     Principal Problem:    Diabetic infection of right foot ([address]  Active Problems:    Acute deep vein thrombosis (DVT) of popliteal ve nightly at home  - initially increased long acting to 40 untis, added mealtime at 10 units TID, but BS low  - changed levemir to 35 and meal time to 5 TID, BS remain low, decrease levemir to 32  - meal time with SSI and accuchecks     HTN/HLD  - resume sta 94 79 171*   BUN 7 6* 5*   CREATSERUM 0.66* 0.67* 0.79   GFRAA 118 117 110   GFRNAA 102 102 95   CA 8.9 9.2 9.4   * 136 135*   K 4.0 3.8 4.6    102 99   CO2 25.0 27.0 35.0*       No results for input(s): ALT, AST, ALB, AMYLASE, LIPASE, LDH in t

## 2020-02-03 NOTE — OCCUPATIONAL THERAPY NOTE
OCCUPATIONAL THERAPY TREATMENT NOTE - INPATIENT    Room Number: 564/564-A         Presenting Problem: diabetic infection of R foot     Problem List  Principal Problem:    Diabetic infection of right foot ([address]  Active Problems:    Acute deep vein thrombosi clothing?: A Little  -   Bathing (including washing, rinsing, drying)?: A Little  -   Toileting, which includes using toilet, bedpan or urinal? : None  -   Putting on and taking off regular upper body clothing?: None  -   Taking care of personal grooming s

## 2020-02-03 NOTE — PROGRESS NOTES
Vascular Access Note  Inserted by Jazz Stubbs RN  Vascular Access Screening:   Allergies to Lidocaine: no  Allergies to Latex: no  Presence of Pacemaker/Defibrillator: No  Mastectomy with Lymph Node Dissection: No  AV Fistula / AV Graft: No  Dialysis Cath

## 2020-02-03 NOTE — PROGRESS NOTES
[facility] AND [facility]  [address] Infectious Disease Progress Note    Manolo Rodgers Patient Status:  Inpatient    10/22/1955 MRN [id number]   Location [facility] 5SW/SE Attending Thien Marie MD   Hosp Day # 3 PCP No primary care provider on file.      Subjecti Exam:  General: Alert, orientated x3. Cooperative. No apparent distress. Vital Signs:  Blood pressure 131/68, pulse 86, temperature 98.7 °F (37.1 °C), temperature source Oral, resp.  rate 18, height 6' 8\" (2.032 m), weight 228 lb 13.4 oz (103.8 kg), SpO OM,  - 5TH toe amputation R foot, disarticulation at the MTP joint on 1/30.  - Follow OR cul which is growing Strep mitis and path of clearing segment,   - IV Vancomycin and Ceftriaxone ongoing, tolerating it well,      2.  Cellulitis to the R foot due to

## 2020-02-03 NOTE — CM/SW NOTE
3:47pm Update- Per Wendy Memos at [facility], cost for 2 grams of ceftriaxone with supplies will be around $106/wk. EDGAR spoke with the pt who states he has seen a doctor at [facility] in [place], [place], but can't remember their name.  EDGAR placed call to that would only have full coverage for the first 20 days. Day 21+, he would be responsible for at least the copay of $176 each day.  It may be worthwhile to check the pricing for home infusion using a nationwide provider that can reach his home ([place] [place] [facility]

## 2020-02-04 LAB
ALBUMIN SERPL-MCNC: 2.7 G/DL (ref 3.4–5)
ALP LIVER SERPL-CCNC: 70 U/L (ref 45–117)
ALT SERPL-CCNC: 35 U/L (ref 16–61)
APTT PPP: 111.1 SECONDS (ref 23.2–35.3)
APTT PPP: 123.2 SECONDS (ref 23.2–35.3)
APTT PPP: 90.3 SECONDS (ref 23.2–35.3)
AST SERPL-CCNC: 60 U/L (ref 15–37)
BILIRUB DIRECT SERPL-MCNC: 0.2 MG/DL (ref 0–0.2)
BILIRUB SERPL-MCNC: 0.2 MG/DL (ref 0.1–2)
GLUCOSE BLDC GLUCOMTR-MCNC: 146 MG/DL (ref 70–99)
GLUCOSE BLDC GLUCOMTR-MCNC: 165 MG/DL (ref 70–99)
GLUCOSE BLDC GLUCOMTR-MCNC: 316 MG/DL (ref 70–99)
GLUCOSE BLDC GLUCOMTR-MCNC: 377 MG/DL (ref 70–99)
HAV IGM SER QL: 1.8 MG/DL (ref 1.6–2.6)
M PROTEIN MFR SERPL ELPH: 8.2 G/DL (ref 6.4–8.2)

## 2020-02-04 RX ORDER — MAGNESIUM OXIDE 400 MG (241.3 MG MAGNESIUM) TABLET
400 TABLET ONCE
Status: COMPLETED | OUTPATIENT
Start: 2020-02-04 | End: 2020-02-04

## 2020-02-04 NOTE — PLAN OF CARE
Problem: Patient Centered Care  Goal: Patient preferences are identified and integrated in the patient's plan of care  Description  Interventions:  - What would you like us to know as we care for you?  From [place];   - Provide timely, comple and evaluate response  - Implement non-pharmacological measures as appropriate and evaluate response  - Consider cultural and social influences on pain and pain management  - Manage/alleviate anxiety  - Utilize distraction and/or relaxation techniques  - M weight bearing to right lower extremity. Pain level is assess using pain scale from 1 to 10. Patient is medicated as needed for pain or discomfort. Patient receives heparin drip infusing per protocol. Plan of care is discussed with patient.  Patient educate

## 2020-02-04 NOTE — OPERATIVE REPORT
[facility]    PATIENT'S NAME: CLINTON DONALD   ATTENDING PHYSICIAN: Donald Van MD   OPERATING PHYSICIAN: Willie Acharya DPM   PATIENT ACCOUNT#:   [de-identified]    LOCATION:  [address] [place] [address] [address] RECORD #:   [id number]       DATE OF BIRTH:  [birth year] for osteomyelitis and gangrene. COMPLICATIONS:  None. DRAINS:  1/4-inch iodoform gauze packing. OPERATIVE TECHNIQUE:  The patient was brought into the operating room with vital signs stable, placed in a supine position on the operating table. room with vital signs stable and the vascular status of his right foot intact to recovery room via cart. Dictated By Lajuan Sacks.  DAVID Acharya  d: 02/03/2020 17:35:45  t: 02/04/2020 02:32:00  Job [id number]  GABINO/

## 2020-02-04 NOTE — PROGRESS NOTES
[facility] Hospitalist Progress Note     CC: Hospital Follow up    PCP: No primary care provider on file.        Assessment/Plan:     Principal Problem:    Diabetic infection of right foot ([address]  Active Problems:    Acute deep vein thrombosis (DVT) of popliteal ve Alexis Bangura    [facility] Hospitalist     Subjective:     No c/o    OBJECTIVE:    Blood pressure 128/77, pulse 64, temperature 97.8 °F (36.6 °C), temperature source Oral, resp. rate 18, height 6' 8\" (2.032 m), weight 228 lb 13.4 oz (103.8 kg), SpO2 97 %. MTP joint. There is minimal residual marrow edema seen within the lateral portion of the 5th metatarsal head preservation of normal marrow signal on T1 weighted imaging. Findings are suggestive of reactive changes. There is no osteomyelitis.   0.8 cm cys

## 2020-02-04 NOTE — CM/SW NOTE
SW meet with pt to discuss SNF options. Referrals sent via [facility]R/[employer] to       1) [facility]  2) [facility]  3) [facility] approved pt. 4) [facility]    Plan: DC SNF pending facility acceptance.     SW/CM to remain available for support and/or discharge

## 2020-02-04 NOTE — PROGRESS NOTES
02/04/20  0503   BP: 139/74   Pulse: 65   Resp: 18   Temp: 98.3 °F (36.8 °C)   Patient was seen resting comfortably in bed his foot is elevated no complaints of pain.     Objective: Dressing was changed today shows the incision line to be well coapted jacinda

## 2020-02-04 NOTE — PLAN OF CARE
Problem: Diabetes/Glucose Control  Goal: Glucose maintained within prescribed range  Description  INTERVENTIONS:  - Monitor Blood Glucose as ordered  - Assess for signs and symptoms of hyperglycemia and hypoglycemia  - Administer ordered medications to m of patient/family/discharge partner  - Complete POLST form as appropriate  - Assess patient's ability to be responsible for managing their own health  - Refer to Case Management Department for coordinating discharge planning if the patient needs post-hospi

## 2020-02-04 NOTE — PROGRESS NOTES
[facility] AND [facility] Infectious Disease  Progress Note    Debbie Leach Patient Status:  Inpatient    10/22/1955 MRN [id number]   Location [facility] 5SW/SE Attending Dagoberto Donnelly MD   Hosp Day # 5 PCP No primary care provider on file.      Paige Chambers Patient needs tight glycemic control for optimal treatment of his infection     4.  Acute RLE DVT  - Anticoagulation     5.  Disposition - improving from ID.  Path with OM noted. Will anticipate 6 weeks of post-op IV ceftriaxone through 3/13/2020.   Social

## 2020-02-05 VITALS
OXYGEN SATURATION: 96 % | HEART RATE: 69 BPM | WEIGHT: 228.81 LBS | RESPIRATION RATE: 20 BRPM | SYSTOLIC BLOOD PRESSURE: 139 MMHG | TEMPERATURE: 99 F | DIASTOLIC BLOOD PRESSURE: 82 MMHG | HEIGHT: 78 IN | BODY MASS INDEX: 26.47 KG/M2

## 2020-02-05 LAB
APTT PPP: 100.7 SECONDS (ref 23.2–35.3)
GLUCOSE BLDC GLUCOMTR-MCNC: 168 MG/DL (ref 70–99)
GLUCOSE BLDC GLUCOMTR-MCNC: 245 MG/DL (ref 70–99)
HAV IGM SER QL: 1.8 MG/DL (ref 1.6–2.6)

## 2020-02-05 RX ORDER — TRAMADOL HYDROCHLORIDE 50 MG/1
50 TABLET ORAL 2 TIMES DAILY PRN
Qty: 10 TABLET | Refills: 0 | Status: SHIPPED | OUTPATIENT
Start: 2020-02-05 | End: 2020-02-10

## 2020-02-05 RX ORDER — MAGNESIUM OXIDE 400 MG (241.3 MG MAGNESIUM) TABLET
400 TABLET ONCE
Status: COMPLETED | OUTPATIENT
Start: 2020-02-05 | End: 2020-02-05

## 2020-02-05 NOTE — PROGRESS NOTES
[facility] AND [facility] Infectious Disease  Progress Note    Sammy Eric Patient Status:  Inpatient    10/22/1955 MRN [id number]   Location [facility] 5SW/SE Attending Cristal Bell MD   Hosp Day # 6 PCP No primary care provider on file.      Sarah Beth Gonzalez IV antibiotics as above     3.  DM II  - Patient needs tight glycemic control for optimal treatment of his infection     4.  Acute RLE DVT  - Anticoagulation     5.  Disposition - improving from ID.  Path with OM noted.  Will anticipate 6 weeks of post-op

## 2020-02-05 NOTE — PROGRESS NOTES
[facility] Hospitalist Progress Note     CC: Hospital Follow up    PCP: No primary care provider on file.        Assessment/Plan:     Principal Problem:    Diabetic infection of right foot ([address]  Active Problems:    Acute deep vein thrombosis (DVT) of popliteal ve Hospitalist     Subjective:     No c/o    OBJECTIVE:    Blood pressure 128/72, pulse 70, temperature 97.6 °F (36.4 °C), temperature source Oral, resp. rate 20, height 6' 8\" (2.032 m), weight 228 lb 13.4 oz (103.8 kg), SpO2 94 %.     Temp:  [97.5 °F (36.4 ° Admission Reconciliation is Not Complete  Discharge Reconciliation is Not Complete the 5th metatarsal head preservation of normal marrow signal on T1 weighted imaging. Findings are suggestive of reactive changes. There is no osteomyelitis.   0.8 cm cystic collection seen within the dorsal soft tissues of the foot immediately dorsal to t Admission Reconciliation is Completed  Discharge Reconciliation is Completed

## 2020-02-05 NOTE — PLAN OF CARE
Problem: Patient Centered Care  Goal: Patient preferences are identified and integrated in the patient's plan of care  Description  Interventions:  - What would you like us to know as we care for you?  From [place];   - Provide timely, comple and evaluate response  - Implement non-pharmacological measures as appropriate and evaluate response  - Consider cultural and social influences on pain and pain management  - Manage/alleviate anxiety  - Utilize distraction and/or relaxation techniques  - M to pick patient up. Non-weight bearing to right lower extremity. Dressing is dry and intact to right lower extremity and kept elevated for safety and comfort. Vital signs and labs are monitored for within normal range.  Plan of care is discussed with patien

## 2020-02-05 NOTE — DISCHARGE PLANNING
Report called to Charge nurse named Mikayla Parada at [facility] regarding patient being transported to facility. Patient personal belongings packed up and sent with patient.  Patient sent to rehab facility with central line for intravenous antibio

## 2020-02-05 NOTE — CM/SW NOTE
02/05/20 1100   Discharge disposition   Expected discharge disposition Skilled Nurs   Name of [address]   Patient is Discharged to a [address] [place], [place] [address]d Yes   Discharge transportation [facility]  (3:00 per Kate Dowling

## 2020-02-11 NOTE — DISCHARGE SUMMARY
General Medicine Discharge Summary     Patient ID:  Galo Clayton  59year old  10/22/1955    Admit date: 1/30/2020    Discharge date and time: 2/5/2020  4:58 PM     Attending Physician: No att. providers found     Consults: IP CONSULT TO INFECTIOUS DISEASE Tabs  Commonly known as:  ELIDANAE  Take 2 tabs (10mg) by mouth twice daily for 7 days, then take 1 tab (5mg) by mouth twice daily thereafter.         CONTINUE taking these medications    aspirin 81 MG Tabs     atorvastatin 10 MG Tabs  Commonly known as:  BERNA sterile gauze, ABD pad Kerlix roll and reapplying the Ace wrap for mild to moderate compression. 5.  If you have questions you can have Dr. Nikko Diaz paged at [phone]  6.   If there is concern for infection please have the patient transported to the [facility]

## 2020-02-13 DIAGNOSIS — I82.409 DVT (DEEP VENOUS THROMBOSIS) (HCC): Primary | ICD-10-CM

## 2020-02-17 ENCOUNTER — OFFICE VISIT (OUTPATIENT)
Dept: PODIATRY CLINIC | Facility: CLINIC | Age: 65
End: 2020-02-17
Payer: MEDICARE

## 2020-02-17 DIAGNOSIS — Z98.890 POST-OPERATIVE STATE: Primary | ICD-10-CM

## 2020-02-17 PROCEDURE — 99024 POSTOP FOLLOW-UP VISIT: CPT | Performed by: PODIATRIST

## 2020-02-17 NOTE — PROGRESS NOTES
Rowena Coreas is a 59year old male. Patient presents with:  Post-Op: sx 1/31/20, right foot: fifth toe amputation, right foot: disarticulation at the metatarsophalangeal joint.  pt arrives to office in dressing he was discharged from hospital in. pt states Procedure Laterality Date   • ANKLE FRACTURE SURGERY  1982   • TOE AMPUTATION Right 1/31/2020    Performed by Irina Jenkins DPM at [address] [place] [address] MAIN OR      Family History   Problem Relation Age of Onset   • Diabetes Mother       Social History    [place] compression. 3.  If you have questions you think there is problems you can have Dr. Santiago Cross paged at [phone]  4.   Patient needs to follow-up in 1 week and will require transportation thank you    The patient indicates understanding of these issues an

## 2020-02-24 ENCOUNTER — OFFICE VISIT (OUTPATIENT)
Dept: PODIATRY CLINIC | Facility: CLINIC | Age: 65
End: 2020-02-24
Payer: MEDICARE

## 2020-02-24 DIAGNOSIS — Z98.890 POST-OPERATIVE STATE: Primary | ICD-10-CM

## 2020-02-24 PROCEDURE — 99024 POSTOP FOLLOW-UP VISIT: CPT | Performed by: PODIATRIST

## 2020-02-24 RX ORDER — INSULIN ASPART 100 [IU]/ML
INJECTION, SOLUTION INTRAVENOUS; SUBCUTANEOUS
COMMUNITY

## 2020-02-24 RX ORDER — TRAMADOL HYDROCHLORIDE 50 MG/1
50 TABLET ORAL EVERY 6 HOURS PRN
COMMUNITY

## 2020-02-24 RX ORDER — INSULIN LISPRO 100 [IU]/ML
INJECTION, SOLUTION INTRAVENOUS; SUBCUTANEOUS
COMMUNITY
Start: 2019-10-21

## 2020-02-24 RX ORDER — SENNOSIDES 8.6 MG
8.6 TABLET ORAL 2 TIMES DAILY
COMMUNITY

## 2020-02-24 NOTE — PROGRESS NOTES
Moses Mendez is a 59year old male. Patient presents with:  Post-Op: s/p right 5th toe amputation -- Sx on 01/31/20. Pain is off and on. Rates pain 6-7/10. Denies any fever or calf pain. BG was 97 prior to lunch.          HPI:   Patient presents to the clin status: Current Every Day Smoker        Years: 40.00        Types: Cigarettes      Smokeless tobacco: Never Used    Substance and Sexual Activity      Alcohol use: Yes        Frequency: Monthly or less      Drug use: Never          REVIEW OF SYSTEMS:   Wally Chen

## 2020-05-08 DIAGNOSIS — I82.409 DVT (DEEP VENOUS THROMBOSIS) (HCC): Primary | ICD-10-CM

## 2020-05-08 DIAGNOSIS — M79.89 RIGHT LEG SWELLING: ICD-10-CM

## 2020-05-19 ENCOUNTER — TELEPHONE (OUTPATIENT)
Dept: INTERVENTIONAL RADIOLOGY/VASCULAR | Facility: HOSPITAL | Age: 65
End: 2020-05-19

## 2020-05-19 NOTE — TELEPHONE ENCOUNTER
Called and spoke to pt regarding scheduling his IVC filter removal. He is now living in [place] and will discuss this with his primary MD during his visit with him.

## (undated) DEVICE — STERILE TETRA-FLEX CF, ELASTIC BANDAGE, 4" X 5.5YD: Brand: TETRA-FLEX™CF

## (undated) DEVICE — TRAY SKIN PREP PVP-1

## (undated) DEVICE — SOL  .9 1000ML BTL

## (undated) DEVICE — SUTURE ETHILON 3-0 669H

## (undated) DEVICE — SUPER SPONGES,MEDIUM: Brand: KERLIX

## (undated) DEVICE — NON-ADHERENT STRIPS,OIL EMULSION: Brand: CURITY

## (undated) DEVICE — WEBRIL COTTON UNDERCAST PADDING: Brand: WEBRIL

## (undated) DEVICE — SYRINGE MNJCT 10ML LF STRL REG

## (undated) DEVICE — SUTURE VICRYL 2-0 FS-1

## (undated) DEVICE — SUTURE ETHILON 2-0 FS

## (undated) DEVICE — GAUZE SPONGES,12 PLY: Brand: CURITY

## (undated) DEVICE — INTENDED FOR TISSUE SEPARATION, AND OTHER PROCEDURES THAT REQUIRE A SHARP SURGICAL BLADE TO PUNCTURE OR CUT.: Brand: BARD-PARKER ® STAINLESS STEEL BLADES

## (undated) DEVICE — GAUZE PACKING IODOFORM 1/4X5

## (undated) DEVICE — PADDING CAST WYTEX 2\" STER

## (undated) DEVICE — 1010 S-DRAPE TOWEL DRAPE 10/BX: Brand: STERI-DRAPE™

## (undated) DEVICE — LOWER EXTREMITY: Brand: MEDLINE INDUSTRIES, INC.

## (undated) DEVICE — ABDOMINAL PAD: Brand: CURITY

## (undated) DEVICE — GAMMEX® NON-LATEX PI ORTHO SIZE 7.5, STERILE POLYISOPRENE POWDER-FREE SURGICAL GLOVE: Brand: GAMMEX

## (undated) DEVICE — SUTURE PROLENE 3-0 8687H

## (undated) DEVICE — OCCLUSIVE GAUZE STRIP OVERWRAP,3% BISMUTH TRIBROMOPHENATE IN PETROLATUM BLEND: Brand: XEROFORM

## (undated) DEVICE — DRESSING CRTY CNFRM 3IN

## (undated) DEVICE — BANDAGE ROLL,100% COTTON, 6 PLY, LARGE: Brand: KERLIX

## (undated) NOTE — IP AVS SNAPSHOT
[facility]            (For Outpatient Use Only) Initial Admit Date: 1/30/2020   Inpt/Obs Admit Date: Inpt: 1/30/20 / Obs: N/A   Discharge Date:    Yesenia Welsh:  [de-identified]   MRN: [de-identified]   CSN: [id number]   CEID: [id number]        [facility]

## (undated) NOTE — IP AVS SNAPSHOT
Patient Demographics     Address  [address] [place] [address] Phone  [phone] [facility])  [phone] (Mobile) *Preferred*      Emergency Contact(s)     Name Relation Home Work [address] [place] [address] Sister   [phone]      Allergies as of 2/ Take 2 tabs (10mg) by mouth twice daily for 7 days, then take 1 tab (5mg) by mouth twice daily thereafter. Felicia Gottlieb MD         aspirin 81 MG Tabs  Next dose due: Tomorrow 9 am      Take 81 mg by mouth daily.           atorvastatin 10 MG Tabs  Commonl [id number] magnesium oxide (MAG-OX) tab 400 mg 02/05/20 0623 Given      [id number] traMADol HCl (ULTRAM) tab 50 mg 02/04/20 1945 Given      [id number] traMADol HCl (ULTRAM) tab 50 mg 02/05/20 0453 Given            LEFT UPPER ARM     Order ID Medication Name Elevations of the aPTT in patients not receiving  anticoagulant therapy (Heparin, etc.), may be  seen in Factor deficiency, vitamin K deficiency,  factor inhibitors, liver disease, etc.  Clinical correlation is recommended.               PTT, ACTIVATED [306 4+ growth Actinomyces neuii     Aerobic Smear 1+ WBCs seen      3+ Gram positive cocci in pairs    Blood Culture FREQ X 2 [[id number]] Collected:  01/30/20 0840    Order Status:  Completed Lab Status:  Final result Updated:  02/04/20 1001    Specimen:  B sob, no nausea or vomiting, no headaches or vision changes no other complaints. 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         PMH  Past Medical History:   Diagnosis Date   • COPD (chronic obstructiv Skin: Skin color, texture, turgor normal. No rashes or lesions.     Neurologic: Normal strength, no focal deficit appreciated     Diagnostic Data:    CBC/Chem  Recent Labs   Lab 01/30/20  0801   WBC 8.4   HGB 13.2   MCV 85.7   .0       Recent Labs Right lower ext DVT  - provoked with foot infection and prolonged immobility with cross country truck ride  - no signs of PE  - IV heparin ordered  - discussed with Podiatry[TN.1] needs amputation tomorrow, discussed with Hematology, popliteal clots have h COPD, HLD, HTN, DM on insulin, active smoker (1 cig/day) who presents with pain and drainage from right lateral toe and right leg swelling.   Patient states that he developed a sore on his 5th toe on his right foot, about 4 days ago that became progressivel Lungs:   Clear to auscultation bilaterally. Normal effort   Chest wall:  No tenderness or deformity. Heart:  Regular rate and rhythm, S1, S2 normal, no murmur, rub or gallop appreciated   Abdomen:   Soft, non-tender.  Bowel sounds normal. No masses,  No o Patient is a 59year old male with PMH sig for COPD, HLD, HTN, DM on insulin, active smoker (1 cig/day) who presents with pain and drainage from right lateral toe and right leg swelling.     DM foot ulcer/infection of 5th toe and cellulitis of dorsum of rig 1. ED Consult to Infectious Disease [[id number]] ordered by Ellis Cazares MD at [facility] Infectious Disease  Report of Consultation    Jennifer Vela Patient Status:  Emergency    10/22/1955 MRN [id number]   XJB Respiratory: Negative for cough, sputum, hemoptysis, chest pain, wheezing, dyspnea on exertion, or stridor. Cardiovascular: Negative for chest pain, palpitations, irregular heart beats.    Gastrointestinal:  No abdominal pain, nausea, vomiting, diarrhea, Component Value Date    WBC 8.4 01/30/2020    HGB 13.2 01/30/2020    HCT 40.8 01/30/2020    .0 01/30/2020    CREATSERUM 0.99 01/30/2020    BUN 10 01/30/2020     01/30/2020    K 4.6 01/30/2020     01/30/2020    CO2 29.0 01/30/2020    [facility] Physical Therapy Notes (last 72 hours) (Notes from 2/2/2020  2:32 PM through 2/5/2020  2:32 PM)    No notes of this type exist for this encounter.         Occupational Therapy Notes (last 72 hours) (Notes from 2/2/2020  2:32 PM through 2/5/2020  2:32 PM) simplification techniques;ADL training;IADL training;Functional transfer training;Patient/Family training;Patient/Family education;Equipment eval/education    SUBJECTIVE  \"I have used crutches when I had screws in my ankle. \"    OBJECTIVE  Precautions: No Patient will tolerate standing for 3 minutes in prep for adls with MOD I   Comment: Progressing    Patient will complete item retrieval with MOD I  Comment: Progressing            Goals  on: 2/15/2020  Frequency: 3-5x/week    Jamal Dupree MA, OTR